# Patient Record
(demographics unavailable — no encounter records)

---

## 2025-03-07 NOTE — PHYSICAL EXAM

## 2025-03-07 NOTE — ASSESSMENT
[FreeTextEntry1] : HTN- BP not at goal, if still elevated will increase lisinopril on next visit.  PAF- The patient's [id number] score = 4, 8.5 % annual stroke risk.  Starting Eliquis 5 mg BID  Edema- Renal, liver and thyroid tests are NL. Labs on next visit to include a BNP.  Told to elevated legs more.  HLD- continue statin

## 2025-03-07 NOTE — HISTORY OF PRESENT ILLNESS
[FreeTextEntry1] : 89 y/o M with HTN, DM and HLD.  Here for chronic LE edema.  Pt very neglectful, does not shower.  He is found to be in A Fib today. He denies CP and SOB.  EKG: Atrial Fibrillation with non specific ST-T wave abnormalities.   Computer not letting me verify meds, some are being unverified by the computer!!!

## 2025-03-07 NOTE — REASON FOR VISIT
[FreeTextEntry1] : [facility] [place] [facility]- [phone] Ashley (care team)  [phone] Mona (aid)